# Patient Record
Sex: FEMALE | Race: BLACK OR AFRICAN AMERICAN | URBAN - METROPOLITAN AREA
[De-identification: names, ages, dates, MRNs, and addresses within clinical notes are randomized per-mention and may not be internally consistent; named-entity substitution may affect disease eponyms.]

---

## 2020-03-09 ENCOUNTER — OFFICE VISIT (OUTPATIENT)
Dept: PULMONOLOGY | Age: 50
End: 2020-03-09

## 2020-03-09 VITALS
HEIGHT: 66 IN | RESPIRATION RATE: 19 BRPM | OXYGEN SATURATION: 99 % | SYSTOLIC BLOOD PRESSURE: 110 MMHG | WEIGHT: 215 LBS | TEMPERATURE: 98.2 F | BODY MASS INDEX: 34.55 KG/M2 | DIASTOLIC BLOOD PRESSURE: 60 MMHG | HEART RATE: 71 BPM

## 2020-03-09 DIAGNOSIS — J45.30 MILD PERSISTENT ASTHMA WITHOUT COMPLICATION: Primary | ICD-10-CM

## 2020-03-09 DIAGNOSIS — E66.9 OBESITY (BMI 30.0-34.9): ICD-10-CM

## 2020-03-09 DIAGNOSIS — R29.818 SUSPECTED SLEEP APNEA: ICD-10-CM

## 2020-03-09 DIAGNOSIS — J30.9 ALLERGIC RHINITIS, UNSPECIFIED SEASONALITY, UNSPECIFIED TRIGGER: ICD-10-CM

## 2020-03-09 RX ORDER — AZITHROMYCIN 250 MG/1
250 TABLET, FILM COATED ORAL
COMMUNITY
Start: 2015-12-04 | End: 2020-03-09

## 2020-03-09 RX ORDER — CHLORPHENIRAMINE MALEATE 4 MG
4 TABLET ORAL
COMMUNITY
Start: 2020-03-09 | End: 2020-03-09

## 2020-03-09 RX ORDER — FLUCONAZOLE 150 MG/1
TABLET ORAL
COMMUNITY
Start: 2020-01-10 | End: 2020-03-09

## 2020-03-09 RX ORDER — PREDNISONE 20 MG/1
TABLET ORAL
COMMUNITY
Start: 2019-12-09 | End: 2020-03-09

## 2020-03-09 RX ORDER — BENZONATATE 100 MG/1
CAPSULE ORAL
COMMUNITY
Start: 2015-12-04 | End: 2020-03-09

## 2020-03-09 RX ORDER — INHALER, ASSIST DEVICES
SPACER (EA) MISCELLANEOUS
COMMUNITY
Start: 2019-12-18 | End: 2020-03-09

## 2020-03-09 RX ORDER — MELOXICAM 15 MG/1
TABLET ORAL
COMMUNITY
Start: 2019-12-18 | End: 2020-03-09

## 2020-03-09 RX ORDER — BUTALBITAL, ACETAMINOPHEN AND CAFFEINE 50; 325; 40 MG/1; MG/1; MG/1
1 TABLET ORAL
COMMUNITY
Start: 2017-08-05 | End: 2020-03-09

## 2020-03-09 RX ORDER — NAPROXEN 500 MG/1
500 TABLET ORAL
COMMUNITY
Start: 2019-10-05 | End: 2020-03-09

## 2020-03-09 RX ORDER — METRONIDAZOLE 500 MG/1
TABLET ORAL
COMMUNITY
Start: 2020-01-10 | End: 2020-03-09

## 2020-03-09 RX ORDER — IBUPROFEN 600 MG/1
600 TABLET ORAL
COMMUNITY
Start: 2019-12-09

## 2020-03-09 RX ORDER — CETIRIZINE HCL 10 MG
10 TABLET ORAL
COMMUNITY
Start: 2020-03-09 | End: 2020-03-09

## 2020-03-09 RX ORDER — ACETAMINOPHEN 500 MG
1000 TABLET ORAL
COMMUNITY
Start: 2019-10-05 | End: 2020-03-09

## 2020-03-09 RX ORDER — TRAMADOL HYDROCHLORIDE 50 MG/1
TABLET ORAL
COMMUNITY
Start: 2015-08-29 | End: 2020-03-09

## 2020-03-09 RX ORDER — ALBUTEROL SULFATE 90 UG/1
2 AEROSOL, METERED RESPIRATORY (INHALATION)
COMMUNITY

## 2020-03-09 RX ORDER — FLUTICASONE PROPIONATE AND SALMETEROL XINAFOATE 230; 21 UG/1; UG/1
2 AEROSOL, METERED RESPIRATORY (INHALATION) 2 TIMES DAILY
COMMUNITY
Start: 2019-12-18

## 2020-03-09 NOTE — PROGRESS NOTES
Verbal Order with read back per More Ku MD  For PFT smart panel. AMB POC PFT complete w/ bronchodilator  AMB POC PFT complete w/o bronchodilator    Dr. Whitney Loco MD will co-sign the orders.

## 2020-03-09 NOTE — PROGRESS NOTES
PATIENT'S O2 SATS:   99% Room Air Rest, 67 Heart Rate                                          99% Room Air Activity, 66 Heart Rate - Patient Walked 102 Meters                                          SOB 1/10 Scale

## 2020-03-09 NOTE — PROGRESS NOTES
HISTORY OF PRESENT ILLNESS  Joi Barton is a 54 y.o. female referred to establish care for asthma. Pt was diagnosed with Asthma 3 years ago when she complained of shortness of breath described as \"I' lacking Oxygen\", chest tightness with occasional wheezing. Usual triggers include dust and seasonal exposure to pollen. Pt denies chest pain, cough or phlegm production. Symptoms occur on a daily basis. Pt was prescribed Albuterol and Advair which she states did not help. When questioned further pt did not demonstrate correct inhaler technique for both inhalers. Pt also complains of non refreshing sleep, EDS, occasional mood swings and memory loss. Her  reports loud snoring and occasional witnessed apneas. Review of Systems   Constitutional: Negative for chills, diaphoresis, fever, malaise/fatigue and weight loss. HENT: Positive for congestion. Negative for ear discharge, ear pain, hearing loss, nosebleeds, sinus pain, sore throat and tinnitus. Sneezing   Eyes: Negative for blurred vision, double vision, photophobia, pain, discharge and redness. Respiratory: Positive for shortness of breath and wheezing. Negative for cough, hemoptysis, sputum production and stridor. Cardiovascular: Negative for chest pain, palpitations, orthopnea, claudication, leg swelling and PND. Gastrointestinal: Negative for abdominal pain, blood in stool, constipation, diarrhea, heartburn, melena, nausea and vomiting. Genitourinary: Negative for dysuria, flank pain, frequency, hematuria and urgency. Musculoskeletal: Negative for back pain, falls, joint pain, myalgias and neck pain. Skin: Negative for itching and rash. Neurological: Negative for dizziness, tingling, tremors, sensory change, speech change, focal weakness, seizures, loss of consciousness, weakness and headaches. Endo/Heme/Allergies: Negative for environmental allergies and polydipsia. Does not bruise/bleed easily.    Psychiatric/Behavioral: Negative for depression, hallucinations, substance abuse and suicidal ideas. The patient has insomnia. The patient is not nervous/anxious. Past Medical History:   Diagnosis Date    Allergic rhinitis     Asthma      History reviewed. No pertinent surgical history. Current Outpatient Medications on File Prior to Visit   Medication Sig Dispense Refill    albuterol (PROVENTIL HFA, VENTOLIN HFA, PROAIR HFA) 90 mcg/actuation inhaler Take 180 mcg by inhalation. [address] 259-62 mcg/actuation inhaler Take 2 Puffs by inhalation two (2) times a day.  ibuprofen (MOTRIN) 600 mg tablet       acetaminophen (TYLENOL) 500 mg tablet Take 1,000 mg by mouth.  azithromycin (ZITHROMAX) 250 mg tablet Take 250 mg by mouth.  benzonatate (TESSALON) 100 mg capsule Take 1 capsule 3 times daily as needed for coughing, swallow capsules whole.  butalbital-acetaminophen-caffeine (FIORICET, ESGIC) -40 mg per tablet Take 1 Tab by mouth.  cetirizine (ZYRTEC) 10 mg tablet Take 10 mg by mouth.  chlorpheniramine (CHLOR-TRIMETRON) 4 mg tablet Take 4 mg by mouth.  fluconazole (DIFLUCAN) 150 mg tablet       AEROCHAMBER Z-STAT PLUS-FLW SG       magic mouthwash (FIRST-MOUTHWASH BLM) 045--40 mg/30 mL mwsh oral suspension 100mL 2% viscous lidocaine   100mL 12.5mg/5mL benadryl  100mL Maalox - *DO NOT SUBSTITUTE KAOPECTATE     Sig: Swish/spit or swish/swallow 5ml every 4-6 hours as needed for pain.  meloxicam (MOBIC) 15 mg tablet       metroNIDAZOLE (FLAGYL) 500 mg tablet       naproxen (NAPROSYN) 500 mg tablet Take 500 mg by mouth.  predniSONE (DELTASONE) 20 mg tablet       traMADoL (ULTRAM) 50 mg tablet        No current facility-administered medications on file prior to visit.       No Known Allergies  Family History   Problem Relation Age of Onset    Asthma Sister      Social History     Socioeconomic History    Marital status:      Spouse name: Not on file    Number of children: Not on file    Years of education: Not on file    Highest education level: Not on file   Occupational History    Not on file   Social Needs    Financial resource strain: Not on file    Food insecurity:     Worry: Not on file     Inability: Not on file    Transportation needs:     Medical: Not on file     Non-medical: Not on file   Tobacco Use    Smoking status: Never Smoker    Smokeless tobacco: Never Used   Substance and Sexual Activity    Alcohol use: Not on file    Drug use: Not on file    Sexual activity: Not on file   Lifestyle    Physical activity:     Days per week: Not on file     Minutes per session: Not on file    Stress: Not on file   Relationships    Social connections:     Talks on phone: Not on file     Gets together: Not on file     Attends Anglican service: Not on file     Active member of club or organization: Not on file     Attends meetings of clubs or organizations: Not on file     Relationship status: Not on file    Intimate partner violence:     Fear of current or ex partner: Not on file     Emotionally abused: Not on file     Physically abused: Not on file     Forced sexual activity: Not on file   Other Topics Concern    Not on file   Social History Narrative    Pt is a homemaker. She grew up in [place] and moved to the [address] [place] [address] Floor 3 years ago. Pt has no dogs or cats but has had parakeets for 2 years     Blood pressure 110/60, pulse 71, temperature 98.2 °F (36.8 °C), temperature source Oral, resp. rate 19, height 5' 6\" (1.676 m), weight 97.5 kg (215 lb), SpO2 99 %. Physical Exam  Constitutional:       Appearance: Normal appearance. She is obese. HENT:      Head: Normocephalic and atraumatic. Right Ear: External ear normal.      Left Ear: External ear normal.      Nose: Nose normal. No congestion or rhinorrhea. Mouth/Throat:      Mouth: Mucous membranes are moist.      Pharynx: Oropharynx is clear.  No oropharyngeal exudate or posterior oropharyngeal erythema. Comments: Mallampati 4  Eyes:      General: No scleral icterus. Right eye: No discharge. Left eye: No discharge. Extraocular Movements: Extraocular movements intact. Conjunctiva/sclera: Conjunctivae normal.      Pupils: Pupils are equal, round, and reactive to light. Neck:      Musculoskeletal: No neck rigidity or muscular tenderness. Vascular: No carotid bruit. Cardiovascular:      Rate and Rhythm: Normal rate and regular rhythm. Pulses: Normal pulses. Heart sounds: Normal heart sounds. No murmur. No friction rub. No gallop. Pulmonary:      Effort: Pulmonary effort is normal. No respiratory distress. Breath sounds: Normal breath sounds. No stridor. No wheezing, rhonchi or rales. Chest:      Chest wall: No tenderness. Abdominal:      Palpations: Abdomen is soft. There is no mass. Tenderness: There is no abdominal tenderness. There is no guarding or rebound. Musculoskeletal:         General: No swelling, tenderness, deformity or signs of injury. Right lower leg: No edema. Left lower leg: No edema. Lymphadenopathy:      Cervical: No cervical adenopathy. Skin:     General: Skin is warm and dry. Coloration: Skin is not pale. Findings: No bruising, lesion or rash. Neurological:      General: No focal deficit present. Mental Status: She is alert. She is disoriented. Motor: No weakness. Coordination: Coordination normal.   Psychiatric:         Mood and Affect: Mood normal.         Behavior: Behavior normal.         Thought Content: Thought content normal.         Judgment: Judgment normal.       Spirometry mild obstruction with predominant small airways disease. No significant BD response    ASSESSMENT and PLAN  Encounter Diagnoses   Name Primary?     Mild persistent asthma without complication Yes    Suspected sleep apnea     Allergic rhinitis, unspecified seasonality, unspecified trigger     Obesity (BMI 30.0-34. 9)        Asthma with suboptimal control, partly due to poor inhaler technique and noncompliance with maintenance inhaler. Reviewed proper indications and techniques for both Albuterol and Advair. Will continue above medications and reassess in 3 months. Pt with likely sleep apnea given above symptoms. Will schedule split night study ASAP. Healthy weight discussed, including impact of weight on Asthma and JOHN control. Would consider allergen testing or trial period away from parakeets if symptoms persist despite maximal medications. Reviewed spirometry with pt and  and discussed plan of care. RTC 3 months. Over 50% of this 45 minute visit was spent in face to face counseling.

## 2020-03-09 NOTE — PROGRESS NOTES
Joi Barton presents today for   Chief Complaint   Patient presents with    Asthma       Is someone accompanying this pt? Yes     Is the patient using any DME equipment during OV? No    -DME Company None    Depression Screening:  No flowsheet data found. Learning Assessment:  No flowsheet data found. Abuse Screening:  No flowsheet data found. Fall Risk  No flowsheet data found. Coordination of Care:  1. Have you been to the ER, urgent care clinic since your last visit? Hospitalized since your last visit? No    2. Have you seen or consulted any other health care providers outside of the [address] [place] [address] since your last visit? Include any pap smears or colon screening.  No

## 2020-07-10 ENCOUNTER — HOSPITAL ENCOUNTER (OUTPATIENT)
Dept: SLEEP MEDICINE | Age: 50
Discharge: HOME | End: 2020-07-10
Attending: INTERNAL MEDICINE

## 2020-08-26 ENCOUNTER — HOSPITAL ENCOUNTER (OUTPATIENT)
Dept: SLEEP MEDICINE | Age: 50
Discharge: HOME | End: 2020-08-27
Attending: INTERNAL MEDICINE
Payer: OTHER GOVERNMENT

## 2020-08-26 DIAGNOSIS — R29.818 SUSPECTED SLEEP APNEA: ICD-10-CM

## 2020-08-26 PROCEDURE — 95810 POLYSOM 6/> YRS 4/> PARAM: CPT

## 2020-08-27 VITALS
HEIGHT: 66 IN | DIASTOLIC BLOOD PRESSURE: 60 MMHG | BODY MASS INDEX: 34.55 KG/M2 | HEART RATE: 69 BPM | WEIGHT: 215 LBS | TEMPERATURE: 96.9 F | SYSTOLIC BLOOD PRESSURE: 90 MMHG

## 2020-08-28 ENCOUNTER — DOCUMENTATION ONLY (OUTPATIENT)
Dept: PULMONOLOGY | Age: 50
End: 2020-08-28

## 2020-08-28 NOTE — PROGRESS NOTES
The patient underwent sleep testing on 8/26/2020. Recommendations listed below. Please refer to full report for specific details. Overall, the patient appeared to tolerate study well and reported sleeping similar to typical home sleep pattern. Mild to moderate snoring was noted. No findings of Obstructive Sleep Apnea, periodic limb movement abnormalities and/or hypoxia. REM supine sleep was achieved. Sleep onset was slightly delayed but with acceptable sleep efficiency. <U>DIAGNOSIS: 1) Primary Snoring  2) Poor Sleep Hygiene    RECOMMENDATIONS:      If snoring is disruptive a trial of antihistamines, external nasal strips, and/or nasal steroids may be beneficial.   Other non-invasive treatment options are recommended were applicable and include the following: weight reduction, smoking cessation, and modification of alcohol ingestion and/or sedating agents.  If sino-nasal anatomic abnormalities are present, an ENT evaluation for possible surgical correction may be considered.  Healthy sleep habit education and reinforcement will be reviewed with the patient.  Individuals are encouraged to obtain 7-9 hours of sleep per day.   safety is encouraged. Drowsy and/or inattentive driving should be avoided.  Follow up with referring provider.          Andrez Khanna DO Electronically signed at 12:04:48 PM, August 28, 2020      Andrez Khanna DO, [facility]    [facility]  Pulmonary, Critical Care, and Sleep Medicine

## 2020-09-09 ENCOUNTER — TELEPHONE (OUTPATIENT)
Dept: PULMONOLOGY | Age: 50
End: 2020-09-09

## 2020-09-09 NOTE — TELEPHONE ENCOUNTER
Pt had sleep study done 8/26/2020 @ [facility]. Please call [phone] with the results.

## 2020-09-16 NOTE — TELEPHONE ENCOUNTER
MD Alanna Mullins LPN    Caller: Unspecified (1 week ago, 11:35 AM)               Will discuss on follow up, per my note sometime in March. Please schedule. Could be virtual visit. Thank you      Patient is scheduled for 9/23/2020.

## 2020-09-17 RX ORDER — DICLOFENAC SODIUM 75 MG/1
75 TABLET, DELAYED RELEASE ORAL
COMMUNITY
Start: 2020-03-26

## 2020-09-23 ENCOUNTER — OFFICE VISIT (OUTPATIENT)
Dept: PULMONOLOGY | Age: 50
End: 2020-09-23
Payer: OTHER GOVERNMENT

## 2020-09-23 VITALS
SYSTOLIC BLOOD PRESSURE: 119 MMHG | HEART RATE: 79 BPM | BODY MASS INDEX: 34.87 KG/M2 | HEIGHT: 66 IN | TEMPERATURE: 98.8 F | OXYGEN SATURATION: 100 % | WEIGHT: 217 LBS | RESPIRATION RATE: 18 BRPM | DIASTOLIC BLOOD PRESSURE: 69 MMHG

## 2020-09-23 DIAGNOSIS — J45.30 MILD PERSISTENT EXTRINSIC ASTHMA WITHOUT COMPLICATION: Primary | ICD-10-CM

## 2020-09-23 DIAGNOSIS — R06.83 PRIMARY SNORING: ICD-10-CM

## 2020-09-23 DIAGNOSIS — J30.9 ALLERGIC RHINITIS, UNSPECIFIED SEASONALITY, UNSPECIFIED TRIGGER: ICD-10-CM

## 2020-09-23 DIAGNOSIS — E66.9 OBESITY (BMI 30-39.9): ICD-10-CM

## 2020-09-23 PROCEDURE — 99214 OFFICE O/P EST MOD 30 MIN: CPT | Performed by: INTERNAL MEDICINE

## 2020-09-23 RX ORDER — FLUTICASONE PROPIONATE 50 MCG
2 SPRAY, SUSPENSION (ML) NASAL DAILY
Qty: 1 BOTTLE | Refills: 3 | Status: SHIPPED | OUTPATIENT
Start: 2020-09-23

## 2020-09-23 NOTE — PROGRESS NOTES
[address] presents today for   Chief Complaint   Patient presents with    Asthma     follow up from 3/9/2020    Sleep Apnea    Results     PSG 8/26/2020    Allergic Rhinitis       Is someone accompanying this pt? No    Is the patient using any DME equipment during OV? No    -DME Company N/A    Depression Screening:  3 most recent PHQ Screens 9/23/2020   Little interest or pleasure in doing things Not at all   Feeling down, depressed, irritable, or hopeless Not at all   Total Score PHQ 2 0       Learning Assessment:  Learning Assessment 9/23/2020   PRIMARY LEARNER Patient   PRIMARY LANGUAGE ENGLISH   LEARNER PREFERENCE PRIMARY DEMONSTRATION   ANSWERED BY Patient   RELATIONSHIP SELF       Abuse Screening:  No flowsheet data found. Fall Risk  No flowsheet data found. Coordination of Care:  1. Have you been to the ER, urgent care clinic since your last visit? Hospitalized since your last visit? No    2. Have you seen or consulted any other health care providers outside of the [address] [place] [address] since your last visit? Include any pap smears or colon screening. Yes.  Dr. Jefferson Cortez, PCP

## 2020-09-23 NOTE — PROGRESS NOTES
HISTORY OF PRESENT ILLNESS  Bernardo Mancilla is a 54 y.o. female following up for asthma. Pt was diagnosed with Asthma 3 years ago when she complained of shortness of breath described as \"I'm lacking Oxygen\", chest tightness with occasional wheezing. Usual triggers include dust and seasonal exposure to pollen. Pt denies chest pain, cough or phlegm production. Symptoms occur on a daily basis. Pt was prescribed Albuterol and Advair which she states did not help however pt did not demonstrate correct inhaler technique when asked to demonstrate. She has noted significant improvement in SOB and wheezing after using inhalers properly. Improvement was also note after the death of her 's pet parakeets. Pt's family recently got a dog but pt has not noticed a change in symptoms. Pt also complained of non refreshing sleep, EDS, occasional mood swings and memory loss with family reporting loud snoring. Sleep study was negative for sleep apnea or hypoxemia but was notable for primary snoring. Poor sleep persists. Asthma   The history is provided by the patient. This is a chronic problem. The problem has been gradually improving. The symptoms are relieved by medications. Treatments tried: Advair and Albuterol. The treatment provided significant relief. Review of Systems   Constitutional: Negative for chills, diaphoresis, fever, malaise/fatigue and weight loss. HENT: Positive for congestion. Negative for ear discharge, ear pain, hearing loss, nosebleeds, sinus pain, sore throat and tinnitus. Sneezing and rhinorrhea   Eyes: Negative for blurred vision, double vision, photophobia, pain, discharge and redness. Respiratory: Positive for wheezing (improved). Negative for cough, hemoptysis, sputum production and stridor. Cardiovascular: Negative for palpitations, orthopnea, claudication, leg swelling and PND.    Gastrointestinal: Negative for blood in stool, constipation, diarrhea, heartburn, melena, nausea and vomiting. Genitourinary: Negative for dysuria, flank pain, frequency, hematuria and urgency. Musculoskeletal: Negative for back pain, falls, joint pain, myalgias and neck pain. Skin: Negative for itching and rash. Neurological: Negative for dizziness, tingling, tremors, sensory change, speech change, focal weakness, seizures, loss of consciousness and weakness. Endo/Heme/Allergies: Negative for environmental allergies and polydipsia. Does not bruise/bleed easily. Psychiatric/Behavioral: Negative for depression, hallucinations, memory loss, substance abuse and suicidal ideas. The patient has insomnia. The patient is not nervous/anxious. Past Medical History:   Diagnosis Date    Allergic rhinitis     Asthma      History reviewed. No pertinent surgical history. Current Outpatient Medications on File Prior to Visit   Medication Sig Dispense Refill    diclofenac EC (VOLTAREN) 75 mg EC tablet Take 75 mg by mouth two (2) times daily as needed.  albuterol (PROVENTIL HFA, VENTOLIN HFA, PROAIR HFA) 90 mcg/actuation inhaler Take 2 Puffs by inhalation every six (6) hours as needed. [address] 262-95 mcg/actuation inhaler Take 2 Puffs by inhalation two (2) times a day.  ibuprofen (MOTRIN) 600 mg tablet Take 600 mg by mouth every eight (8) hours as needed. No current facility-administered medications on file prior to visit.       No Known Allergies  Family History   Problem Relation Age of Onset    Asthma Sister      Social History     Socioeconomic History    Marital status:      Spouse name: Not on file    Number of children: Not on file    Years of education: Not on file    Highest education level: Not on file   Occupational History    Not on file   Social Needs    Financial resource strain: Not on file    Food insecurity     Worry: Not on file     Inability: Not on file    Transportation needs     Medical: Not on file     Non-medical: Not on file   Tobacco Use    Smoking status: Never Smoker    Smokeless tobacco: Never Used   Substance and Sexual Activity    Alcohol use: Not on file    Drug use: Not on file    Sexual activity: Not on file   Lifestyle    Physical activity     Days per week: Not on file     Minutes per session: Not on file    Stress: Not on file   Relationships    Social connections     Talks on phone: Not on file     Gets together: Not on file     Attends Yazidi service: Not on file     Active member of club or organization: Not on file     Attends meetings of clubs or organizations: Not on file     Relationship status: Not on file    Intimate partner violence     Fear of current or ex partner: Not on file     Emotionally abused: Not on file     Physically abused: Not on file     Forced sexual activity: Not on file   Other Topics Concern    Not on file   Social History Narrative    Pt is a homemaker. She grew up in [place] and moved to the [address] [place] [address] Floor 3 years ago. Pt has no dogs or cats but has had parakeets for 2 years     Blood pressure 119/69, pulse 79, temperature 98.8 °F (37.1 °C), temperature source Oral, resp. rate 18, height 5' 6\" (1.676 m), weight 98.4 kg (217 lb), last menstrual period 09/03/2020, SpO2 100 %. Physical Exam  Constitutional:       General: She is not in acute distress. Appearance: Normal appearance. She is obese. She is not ill-appearing, toxic-appearing or diaphoretic. HENT:      Head: Normocephalic and atraumatic. Right Ear: External ear normal.      Left Ear: External ear normal.      Nose: No congestion or rhinorrhea. Comments: Deferred      Mouth/Throat:      Pharynx: No oropharyngeal exudate or posterior oropharyngeal erythema. Comments: Deferred   Eyes:      General: No scleral icterus. Right eye: No discharge. Left eye: No discharge. Extraocular Movements: Extraocular movements intact.       Conjunctiva/sclera: Conjunctivae normal.      Pupils: Pupils are equal, round, and reactive to light. Neck:      Musculoskeletal: No neck rigidity or muscular tenderness. Vascular: No carotid bruit. Cardiovascular:      Rate and Rhythm: Normal rate and regular rhythm. Pulses: Normal pulses. Heart sounds: Normal heart sounds. No murmur. No friction rub. No gallop. Pulmonary:      Effort: Pulmonary effort is normal. No respiratory distress. Breath sounds: Normal breath sounds. No stridor. No rhonchi or rales. Wheezes: faint R upper lung field. Chest:      Chest wall: No tenderness. Abdominal:      Palpations: Abdomen is soft. There is no mass. Tenderness: There is no abdominal tenderness. There is no guarding or rebound. Musculoskeletal:         General: No swelling, tenderness, deformity or signs of injury. Right lower leg: No edema. Left lower leg: No edema. Lymphadenopathy:      Cervical: No cervical adenopathy. Skin:     General: Skin is warm and dry. Coloration: Skin is not pale. Findings: No bruising, lesion or rash. Neurological:      General: No focal deficit present. Mental Status: She is alert. She is disoriented. Motor: No weakness. Coordination: Coordination normal.   Psychiatric:         Mood and Affect: Mood normal.         Behavior: Behavior normal.         Thought Content: Thought content normal.         Judgment: Judgment normal.       Sleep study attached    ASSESSMENT and PLAN  Encounter Diagnoses   Name Primary?  Mild persistent extrinsic asthma without complication Yes    Primary snoring     Obesity (BMI 30-39. 9)     Allergic rhinitis, unspecified seasonality, unspecified trigger        Asthma with improved control on proper device technique as well as removal of likely trigger. Continue Advair and prn Albuterol. Reviewed sleep study results with pt, will start nasal steroids to improve congestion and hopefully improve snoring. Rx for Flonase written, see orders.   Healthy weight discussed, including impact of weight on Asthma and JOHN control. Flu vaccine by PCP  RTC 6 months.

## 2020-09-24 ENCOUNTER — TELEPHONE (OUTPATIENT)
Dept: PULMONOLOGY | Age: 50
End: 2020-09-24